# Patient Record
Sex: MALE | Race: WHITE | NOT HISPANIC OR LATINO | Employment: FULL TIME | URBAN - NONMETROPOLITAN AREA
[De-identification: names, ages, dates, MRNs, and addresses within clinical notes are randomized per-mention and may not be internally consistent; named-entity substitution may affect disease eponyms.]

---

## 2017-04-11 ENCOUNTER — OFFICE VISIT (OUTPATIENT)
Dept: GASTROENTEROLOGY | Facility: CLINIC | Age: 57
End: 2017-04-11

## 2017-04-11 VITALS
WEIGHT: 215.4 LBS | DIASTOLIC BLOOD PRESSURE: 90 MMHG | TEMPERATURE: 97.8 F | SYSTOLIC BLOOD PRESSURE: 138 MMHG | BODY MASS INDEX: 34.62 KG/M2 | HEIGHT: 66 IN | OXYGEN SATURATION: 90 % | HEART RATE: 94 BPM

## 2017-04-11 DIAGNOSIS — I10 ESSENTIAL HYPERTENSION: ICD-10-CM

## 2017-04-11 DIAGNOSIS — Z80.0 FAMILY HISTORY OF COLON CANCER: Primary | ICD-10-CM

## 2017-04-11 PROCEDURE — S0260 H&P FOR SURGERY: HCPCS | Performed by: NURSE PRACTITIONER

## 2017-04-11 RX ORDER — MELATONIN
1000 DAILY
COMMUNITY

## 2017-04-11 NOTE — PROGRESS NOTES
Chief Complaint   Patient presents with   • Colonoscopy     Patient currently not having any problems.  Possibly has family history of colon cancer. - Father     Subjective   HPI    Thierno Sebastian is a 56 y.o. male who presents to office for preventative maintenance.  There is not  a personal history of colon polyps.  There is not a history of colon cancer.  He does not have complaints of nausea/vomiting, change in bowels, weight loss, no BRBPR, no melena.  There is a family history of colon cancer-father dx age of 66.  There is not a family history of colon polyps.  Pt last colonoscopy-2011 .  Bowels do move on regular basis.    Past Medical History:   Diagnosis Date   • Hyperlipidemia    • Hypertension    • Obstructive sleep apnea on CPAP      Past Surgical History:   Procedure Laterality Date   • COLONOSCOPY  06/24/2011    normal   • TOE AMPUTATION      x2     Outpatient Prescriptions Marked as Taking for the 4/11/17 encounter (Office Visit) with KAITY Gonzalez   Medication Sig Dispense Refill   • amLODIPine (NORVASC) 5 MG tablet Take 5 mg by mouth Daily.     • aspirin 81 MG EC tablet Take 81 mg by mouth Daily.     • cholecalciferol (VITAMIN D3) 1000 UNITS tablet Take 1,000 Units by mouth Daily.     • losartan-hydrochlorothiazide (HYZAAR) 100-25 MG per tablet Take 1 tablet by mouth Daily.     • Omega-3 Fatty Acids (FISH OIL) 1000 MG capsule capsule Take 1,000 mg by mouth Daily With Breakfast.     • Psyllium (METAMUCIL FIBER PO) Take  by mouth.       No Known Allergies  Social History     Social History   • Marital status:      Spouse name: N/A   • Number of children: N/A   • Years of education: N/A     Occupational History   • Not on file.     Social History Main Topics   • Smoking status: Current Every Day Smoker     Types: Electronic Cigarette   • Smokeless tobacco: Not on file   • Alcohol use 0.6 oz/week     1 Cans of beer per week   • Drug use: No   • Sexual activity: Not on file     Other  Topics Concern   • Not on file     Social History Narrative     Family History   Problem Relation Age of Onset   • Colon cancer Father    • Colon polyps Neg Hx      Review of Systems   Constitutional: Negative for fatigue, fever and unexpected weight change.   HENT: Negative for hearing loss, sore throat and voice change.    Eyes: Negative for visual disturbance.   Respiratory: Negative for cough, shortness of breath and wheezing.    Cardiovascular: Negative for chest pain and palpitations.   Gastrointestinal: Negative for abdominal pain, blood in stool and vomiting.   Endocrine: Negative for polydipsia and polyuria.   Genitourinary: Negative for difficulty urinating, dysuria, hematuria and urgency.   Musculoskeletal: Negative for joint swelling and myalgias.   Skin: Negative for color change, rash and wound.   Neurological: Negative for dizziness, tremors, seizures and syncope.   Hematological: Does not bruise/bleed easily.   Psychiatric/Behavioral: Negative for agitation and confusion. The patient is not nervous/anxious.      Objective   Vitals:    04/11/17 1336   BP: 138/90   Pulse: 94   Temp: 97.8 °F (36.6 °C)   SpO2: 90%     Physical Exam   Constitutional: He is oriented to person, place, and time. He appears well-developed and well-nourished.   HENT:   Head: Normocephalic and atraumatic.   Eyes:   Pink, Nonicteric   Neck:   Global Assessment- supple. No JVD or lymphadenopathy   Cardiovascular: Normal rate, regular rhythm and normal heart sounds.  Exam reveals no gallop and no friction rub.    No murmur heard.  Pulmonary/Chest: Effort normal and breath sounds normal. No respiratory distress. He has no wheezes. He has no rales.   Inspection: Movements-Symmetrical   Abdominal: Soft. Bowel sounds are normal. He exhibits no distension and no mass. There is no tenderness. There is no rebound and no guarding.   Neurological: He is alert and oriented to person, place, and time.   General Exam-Deemed a reliable  historian, able to converse without difficulty and Able to move all extremities without difficulty     Imaging Results (most recent)     None        Assessment/Plan   Thierno was seen today for colonoscopy.    Diagnoses and all orders for this visit:    Family history of colon cancer  -     Case request  -     Sod Picosulfate-Mag Ox-Cit Acd (PREPOPIK) 10-3.5-12 MG-GM-GM pack; Take as directed    Essential hypertension      COLONOSCOPY WITH ANESTHESIA (N/A)      All risks, benefits, alternatives, and indications of colonoscopy procedure have been discussed with the patient. Risks to include perforation of the colon requiring possible surgery or colostomy, risk of bleeding from biopsies or removal of colon tissue, possibility of missing a colon polyp or cancer, or adverse drug reaction.  Benefits to include the diagnosis and management of disease of the colon and rectum. Alternatives to include barium enema, radiographic evaluation, lab testing or no intervention. Pt verbalizes understanding and agrees.     Patient is to continue all blood pressure and cardiac medications prior to procedure.     Advised pt to stop ASA day prior to procedure and to stop use of NSAIDs, Fish Oil, and MV 5 days prior to procedure.  Tylenol based products are ok to take.  Pt verbalized understanding.      There are no Patient Instructions on file for this visit.

## 2017-05-01 ENCOUNTER — ANESTHESIA EVENT (OUTPATIENT)
Dept: GASTROENTEROLOGY | Facility: HOSPITAL | Age: 57
End: 2017-05-01

## 2017-05-02 ENCOUNTER — ANESTHESIA (OUTPATIENT)
Dept: GASTROENTEROLOGY | Facility: HOSPITAL | Age: 57
End: 2017-05-02

## 2017-05-02 ENCOUNTER — HOSPITAL ENCOUNTER (OUTPATIENT)
Facility: HOSPITAL | Age: 57
Setting detail: HOSPITAL OUTPATIENT SURGERY
Discharge: HOME | End: 2017-05-02
Attending: INTERNAL MEDICINE | Admitting: INTERNAL MEDICINE

## 2017-05-02 ENCOUNTER — TELEPHONE (OUTPATIENT)
Dept: GASTROENTEROLOGY | Facility: CLINIC | Age: 57
End: 2017-05-02

## 2017-05-02 VITALS
HEIGHT: 66 IN | HEART RATE: 64 BPM | OXYGEN SATURATION: 98 % | WEIGHT: 209 LBS | RESPIRATION RATE: 16 BRPM | TEMPERATURE: 97.4 F | DIASTOLIC BLOOD PRESSURE: 66 MMHG | SYSTOLIC BLOOD PRESSURE: 117 MMHG | BODY MASS INDEX: 33.59 KG/M2

## 2017-05-02 PROCEDURE — 45378 DIAGNOSTIC COLONOSCOPY: CPT | Performed by: INTERNAL MEDICINE

## 2017-05-02 PROCEDURE — 25010000002 PROPOFOL 10 MG/ML EMULSION: Performed by: NURSE ANESTHETIST, CERTIFIED REGISTERED

## 2017-05-02 RX ORDER — LIDOCAINE HYDROCHLORIDE 20 MG/ML
INJECTION, SOLUTION INFILTRATION; PERINEURAL AS NEEDED
Status: DISCONTINUED | OUTPATIENT
Start: 2017-05-02 | End: 2017-05-02 | Stop reason: SURG

## 2017-05-02 RX ORDER — SODIUM CHLORIDE 0.9 % (FLUSH) 0.9 %
1-10 SYRINGE (ML) INJECTION AS NEEDED
Status: DISCONTINUED | OUTPATIENT
Start: 2017-05-02 | End: 2017-05-02 | Stop reason: HOSPADM

## 2017-05-02 RX ORDER — PROPOFOL 10 MG/ML
VIAL (ML) INTRAVENOUS AS NEEDED
Status: DISCONTINUED | OUTPATIENT
Start: 2017-05-02 | End: 2017-05-02 | Stop reason: SURG

## 2017-05-02 RX ORDER — SODIUM CHLORIDE 9 MG/ML
100 INJECTION, SOLUTION INTRAVENOUS CONTINUOUS
Status: DISCONTINUED | OUTPATIENT
Start: 2017-05-02 | End: 2017-05-02 | Stop reason: HOSPADM

## 2017-05-02 RX ADMIN — PROPOFOL 50 MG: 10 INJECTION, EMULSION INTRAVENOUS at 10:18

## 2017-05-02 RX ADMIN — LIDOCAINE HYDROCHLORIDE 100 MG: 20 INJECTION, SOLUTION INFILTRATION; PERINEURAL at 10:15

## 2017-05-02 RX ADMIN — PROPOFOL 100 MG: 10 INJECTION, EMULSION INTRAVENOUS at 10:15

## 2017-05-02 RX ADMIN — SODIUM CHLORIDE 100 ML/HR: 9 INJECTION, SOLUTION INTRAVENOUS at 09:45

## 2017-05-02 RX ADMIN — PROPOFOL 50 MG: 10 INJECTION, EMULSION INTRAVENOUS at 10:24

## 2019-03-05 ENCOUNTER — OFFICE VISIT (OUTPATIENT)
Dept: CARDIOLOGY | Facility: CLINIC | Age: 59
End: 2019-03-05

## 2019-03-05 VITALS
BODY MASS INDEX: 35.03 KG/M2 | WEIGHT: 218 LBS | HEIGHT: 66 IN | SYSTOLIC BLOOD PRESSURE: 137 MMHG | DIASTOLIC BLOOD PRESSURE: 92 MMHG | HEART RATE: 78 BPM

## 2019-03-05 DIAGNOSIS — R07.89 OTHER CHEST PAIN: ICD-10-CM

## 2019-03-05 DIAGNOSIS — E87.6 HYPOKALEMIA: ICD-10-CM

## 2019-03-05 DIAGNOSIS — I10 ESSENTIAL HYPERTENSION: Primary | ICD-10-CM

## 2019-03-05 PROCEDURE — 99204 OFFICE O/P NEW MOD 45 MIN: CPT | Performed by: INTERNAL MEDICINE

## 2019-03-05 PROCEDURE — 93000 ELECTROCARDIOGRAM COMPLETE: CPT | Performed by: INTERNAL MEDICINE

## 2019-03-05 RX ORDER — POTASSIUM CHLORIDE 1.5 G/1.77G
20 POWDER, FOR SOLUTION ORAL DAILY
COMMUNITY

## 2019-03-05 RX ORDER — SPIRONOLACTONE 25 MG/1
25 TABLET ORAL DAILY
Qty: 30 TABLET | Refills: 11 | Status: SHIPPED | OUTPATIENT
Start: 2019-03-05 | End: 2020-02-24

## 2019-03-05 RX ORDER — LOSARTAN POTASSIUM 100 MG/1
100 TABLET ORAL DAILY
COMMUNITY

## 2019-03-05 NOTE — PROGRESS NOTES
"Subjective    Thierno Sebastian is a 58 y.o. male. Referred by pcp for cp eval    History of Present Illness     CHEST PAIN:  Began 12/18 when he started on a thiazide diuretic and developed refractory hypokalemia. The pain is a twinge in the upper left chest without any ppt'ing for relieving factors. The cp is not exertional and always occurs at rest. He thinks his Chol is \"ok'. His father was a smoker and started having heart probs at 61 yo. No other cad in the family. His EKG is normal today    HTN:  Has required increasing meds lately and home reading are not to goal. Is compliant with diet and exercise    HYPOKALEMIA:  Is followed closely by his pcp and is now corrected \"3.5\". He is trying to eat a K+ rich diet now and has received limited instruction on that by me today.        The following portions of the patient's history were reviewed and updated as appropriate: allergies, current medications, past family history, past medical history, past social history, past surgical history and problem list.    Patient Active Problem List   Diagnosis   • Other chest pain   • Hypertension   • Hypokalemia       Allergies   Allergen Reactions   • Lisinopril Cough       Family History   Problem Relation Age of Onset   • Colon cancer Father    • Heart attack Father    • Heart attack Mother    • Colon polyps Neg Hx        Social History     Socioeconomic History   • Marital status:      Spouse name: Not on file   • Number of children: Not on file   • Years of education: Not on file   • Highest education level: Not on file   Social Needs   • Financial resource strain: Not on file   • Food insecurity - worry: Not on file   • Food insecurity - inability: Not on file   • Transportation needs - medical: Not on file   • Transportation needs - non-medical: Not on file   Occupational History   • Not on file   Tobacco Use   • Smoking status: Never Smoker   • Smokeless tobacco: Former User   Substance and Sexual Activity   • " "Alcohol use: Yes     Comment: occ   • Drug use: No   • Sexual activity: Defer   Other Topics Concern   • Not on file   Social History Narrative   • Not on file         Current Outpatient Medications:   •  amLODIPine (NORVASC) 5 MG tablet, Take 10 mg by mouth Daily., Disp: , Rfl:   •  aspirin 81 MG EC tablet, Take 81 mg by mouth Daily., Disp: , Rfl:   •  cholecalciferol (VITAMIN D3) 1000 UNITS tablet, Take 1,000 Units by mouth Daily., Disp: , Rfl:   •  losartan (COZAAR) 100 MG tablet, Take 100 mg by mouth Daily., Disp: , Rfl:   •  Omega-3 Fatty Acids (FISH OIL) 1000 MG capsule capsule, Take 1,000 mg by mouth Daily With Breakfast., Disp: , Rfl:   •  potassium chloride (KLOR-CON) 20 MEQ packet, Take 20 mEq by mouth 2 (Two) Times a Day., Disp: , Rfl:   •  spironolactone (ALDACTONE) 25 MG tablet, Take 1 tablet by mouth Daily., Disp: 30 tablet, Rfl: 11    Past Surgical History:   Procedure Laterality Date   • COLONOSCOPY  06/24/2011    normal   • COLONOSCOPY N/A 5/2/2017    Procedure: COLONOSCOPY WITH ANESTHESIA;  Surgeon: Sabino Carter DO;  Location: [facility] ENDOSCOPY;  Service:    • TOE AMPUTATION      x2       Review of Systems   Constitutional: Negative for fever and unexpected weight change.   HENT: Negative for congestion.    Eyes: Negative for visual disturbance.   Respiratory: Negative for apnea, shortness of breath and wheezing.    Cardiovascular: Positive for chest pain. Negative for palpitations and leg swelling.   Gastrointestinal: Negative for abdominal pain, blood in stool and vomiting.   Endocrine: Negative for cold intolerance and heat intolerance.   Genitourinary: Negative for difficulty urinating and hematuria.   Musculoskeletal: Positive for back pain. Negative for myalgias.   Skin: Negative for rash.   Neurological: Negative for syncope.   Hematological: Does not bruise/bleed easily.   Psychiatric/Behavioral: Negative for sleep disturbance.       /92   Pulse 78   Ht 167.6 cm (66\")   Wt 98.9 " kg (218 lb)   BMI 35.19 kg/m²   Procedures    Objective   Physical Exam   Constitutional: He is oriented to person, place, and time. He appears well-developed and well-nourished. No distress.   HENT:   Head: Normocephalic.   Eyes: Pupils are equal, round, and reactive to light.   Neck: No thyromegaly present.   Cardiovascular: Normal rate, regular rhythm, normal heart sounds and intact distal pulses. Exam reveals no gallop and no friction rub.   No murmur heard.  Pulmonary/Chest: Effort normal and breath sounds normal. No stridor. No respiratory distress. He has no wheezes. He has no rales. He exhibits no tenderness.   Abdominal: Soft. Bowel sounds are normal. He exhibits no distension and no mass. There is no tenderness. There is no rebound and no guarding.   Musculoskeletal: He exhibits no edema, tenderness or deformity.   Neurological: He is alert and oriented to person, place, and time.   Skin: Skin is warm and dry. He is not diaphoretic.   Psychiatric: He has a normal mood and affect.       Assessment/Plan   Thierno was seen today for chest pain.    Diagnoses and all orders for this visit:    Essential hypertension  Comments:  DIFFICULT TO CONTROL - long discussion on how to monitor at home - add Aldactone  Orders:  -     ECG 12 Lead  -     spironolactone (ALDACTONE) 25 MG tablet; Take 1 tablet by mouth Daily.    Other chest pain  Comments:  ATYPICAL - check tst in 2 weeks  Orders:  -     Treadmill Stress Test; Future    Hypokalemia  Comments:  POSS COMPONENT OF ALDOSTERONISM  - needs close lab fu with recheck locally in 1 week                 Return in about 3 months (around 6/5/2019) for with apc.  Orders Placed This Encounter   Procedures   • Treadmill Stress Test     Standing Status:   Future     Standing Expiration Date:   3/4/2020     Order Specific Question:   Reason for exam?     Answer:   Chest Pain   • ECG 12 Lead     Order Specific Question:   Reason for Exam:     Answer:   cpBen

## 2019-03-19 ENCOUNTER — HOSPITAL ENCOUNTER (OUTPATIENT)
Dept: CARDIOLOGY | Facility: HOSPITAL | Age: 59
Discharge: HOME | End: 2019-03-19
Admitting: INTERNAL MEDICINE

## 2019-03-19 VITALS
BODY MASS INDEX: 35.04 KG/M2 | WEIGHT: 218.03 LBS | HEART RATE: 70 BPM | SYSTOLIC BLOOD PRESSURE: 120 MMHG | DIASTOLIC BLOOD PRESSURE: 93 MMHG | HEIGHT: 66 IN

## 2019-03-19 DIAGNOSIS — R07.89 OTHER CHEST PAIN: ICD-10-CM

## 2019-03-19 LAB
BH CV STRESS BP STAGE 1: NORMAL
BH CV STRESS BP STAGE 2: NORMAL
BH CV STRESS BP STAGE 4: NORMAL
BH CV STRESS DURATION MIN STAGE 1: 3
BH CV STRESS DURATION MIN STAGE 2: 3
BH CV STRESS DURATION MIN STAGE 3: 3
BH CV STRESS DURATION MIN STAGE 4: 1
BH CV STRESS DURATION SEC STAGE 1: 0
BH CV STRESS DURATION SEC STAGE 2: 0
BH CV STRESS DURATION SEC STAGE 3: 0
BH CV STRESS DURATION SEC STAGE 4: 26
BH CV STRESS GRADE STAGE 1: 10
BH CV STRESS GRADE STAGE 2: 12
BH CV STRESS GRADE STAGE 3: 14
BH CV STRESS GRADE STAGE 4: 16
BH CV STRESS HR STAGE 1: 93
BH CV STRESS HR STAGE 2: 112
BH CV STRESS HR STAGE 3: 134
BH CV STRESS HR STAGE 4: 160
BH CV STRESS METS STAGE 1: 5
BH CV STRESS METS STAGE 2: 7.5
BH CV STRESS METS STAGE 3: 10
BH CV STRESS METS STAGE 4: 13.5
BH CV STRESS PROTOCOL 1: NORMAL
BH CV STRESS RECOVERY BP: NORMAL MMHG
BH CV STRESS RECOVERY HR: 98 BPM
BH CV STRESS SPEED STAGE 1: 1.7
BH CV STRESS SPEED STAGE 2: 2.5
BH CV STRESS SPEED STAGE 3: 3.4
BH CV STRESS SPEED STAGE 4: 4.2
BH CV STRESS STAGE 1: 1
BH CV STRESS STAGE 2: 2
BH CV STRESS STAGE 3: 3
BH CV STRESS STAGE 4: 4
MAXIMAL PREDICTED HEART RATE: 164 BPM
PERCENT MAX PREDICTED HR: 97.56 %
STRESS BASELINE BP: NORMAL MMHG
STRESS BASELINE HR: 70 BPM
STRESS PERCENT HR: 115 %
STRESS POST EXERCISE DUR MIN: 10 MIN
STRESS POST EXERCISE DUR SEC: 26 SEC
STRESS POST PEAK BP: NORMAL MMHG
STRESS POST PEAK HR: 160 BPM
STRESS TARGET HR: 139 BPM

## 2019-03-19 PROCEDURE — 93018 CV STRESS TEST I&R ONLY: CPT | Performed by: INTERNAL MEDICINE

## 2019-03-19 PROCEDURE — 93017 CV STRESS TEST TRACING ONLY: CPT

## 2019-09-10 ENCOUNTER — OFFICE VISIT (OUTPATIENT)
Dept: CARDIOLOGY | Facility: CLINIC | Age: 59
End: 2019-09-10

## 2019-09-10 VITALS
SYSTOLIC BLOOD PRESSURE: 145 MMHG | DIASTOLIC BLOOD PRESSURE: 80 MMHG | HEIGHT: 66 IN | BODY MASS INDEX: 35.52 KG/M2 | HEART RATE: 79 BPM | WEIGHT: 221 LBS | OXYGEN SATURATION: 98 % | RESPIRATION RATE: 18 BRPM

## 2019-09-10 DIAGNOSIS — I10 ESSENTIAL HYPERTENSION: Primary | ICD-10-CM

## 2019-09-10 DIAGNOSIS — E87.6 HYPOKALEMIA: ICD-10-CM

## 2019-09-10 DIAGNOSIS — R07.89 OTHER CHEST PAIN: ICD-10-CM

## 2019-09-10 PROCEDURE — 99214 OFFICE O/P EST MOD 30 MIN: CPT | Performed by: NURSE PRACTITIONER

## 2019-09-10 RX ORDER — AMLODIPINE BESYLATE 10 MG/1
10 TABLET ORAL DAILY
Qty: 90 TABLET | Refills: 3 | Status: SHIPPED | OUTPATIENT
Start: 2019-09-10 | End: 2020-07-21

## 2019-09-10 NOTE — PROGRESS NOTES
Subjective:     Encounter Date:09/10/2019      Patient ID: Thierno Sebastian is a 58 y.o. male with a history of hypertension and atypical chest pain with a negative treadmill stress test.    Chief Complaint: follow up  Hypertension   This is a chronic problem. The current episode started more than 1 year ago. The problem is unchanged. The problem is uncontrolled. Pertinent negatives include no chest pain, malaise/fatigue, orthopnea, palpitations, peripheral edema, PND or shortness of breath.   Chest Pain    The current episode started more than 1 month ago. The problem has been resolved. Pertinent negatives include no cough, dizziness, irregular heartbeat, malaise/fatigue, near-syncope, orthopnea, palpitations, PND, shortness of breath, sputum production, syncope or weakness.     Patient presents today for a follow up for chest pain and hypertension. He was last seen in our office for atypical chest pain. He had a normal treadmill stress test. He was hypokalemic and his HCTZ was stopped and placed on Aldactone. He state he has been having routine labs to monitor his potassium. His last check was last week and was 3.9. He denies chest pain, shortness of breath and palpitations.     The following portions of the patient's history were reviewed and updated as appropriate: allergies, current medications, past family history, past medical history, past social history, past surgical history and problem list.    Allergies   Allergen Reactions   • Lisinopril Cough       Current Outpatient Medications:   •  amLODIPine (NORVASC) 10 MG tablet, Take 1 tablet by mouth Daily., Disp: 90 tablet, Rfl: 3  •  aspirin 81 MG EC tablet, Take 81 mg by mouth Daily., Disp: , Rfl:   •  cholecalciferol (VITAMIN D3) 1000 UNITS tablet, Take 1,000 Units by mouth Daily., Disp: , Rfl:   •  losartan (COZAAR) 100 MG tablet, Take 100 mg by mouth Daily., Disp: , Rfl:   •  Omega-3 Fatty Acids (FISH OIL) 1000 MG capsule capsule, Take 1,000 mg by mouth  "Daily With Breakfast., Disp: , Rfl:   •  potassium chloride (KLOR-CON) 20 MEQ packet, Take 20 mEq by mouth Daily., Disp: , Rfl:   •  spironolactone (ALDACTONE) 25 MG tablet, Take 1 tablet by mouth Daily., Disp: 30 tablet, Rfl: 11  Past Medical History:   Diagnosis Date   • Hypertension    • Obstructive sleep apnea on CPAP      Family History   Problem Relation Age of Onset   • Colon cancer Father    • Heart attack Father    • Heart attack Mother    • Colon polyps Neg Hx      Past Surgical History:   Procedure Laterality Date   • COLONOSCOPY  06/24/2011    normal   • COLONOSCOPY N/A 5/2/2017    Procedure: COLONOSCOPY WITH ANESTHESIA;  Surgeon: Sabino Carter DO;  Location: [facility] ENDOSCOPY;  Service:    • TOE AMPUTATION      x2     Social History     Socioeconomic History   • Marital status:      Spouse name: Not on file   • Number of children: Not on file   • Years of education: Not on file   • Highest education level: Not on file   Tobacco Use   • Smoking status: Never Smoker   • Smokeless tobacco: Former User   Substance and Sexual Activity   • Alcohol use: Yes     Comment: occ   • Drug use: No   • Sexual activity: Defer       Review of Systems   Constitution: Negative for weakness, malaise/fatigue, weight gain and weight loss.   Cardiovascular: Negative for chest pain, dyspnea on exertion, irregular heartbeat, leg swelling, near-syncope, orthopnea, palpitations, paroxysmal nocturnal dyspnea and syncope.   Respiratory: Negative for cough, shortness of breath, sleep disturbances due to breathing, sputum production and wheezing.    Skin: Negative for dry skin, flushing, itching and rash.   Gastrointestinal: Negative for hematemesis and hematochezia.   Neurological: Negative for dizziness, light-headedness and loss of balance.       Procedures  /80 (BP Location: Right arm, Patient Position: Sitting, Cuff Size: Adult)   Pulse 79   Resp 18   Ht 167.6 cm (66\")   Wt 100 kg (221 lb)   SpO2 98%   BMI " 35.67 kg/m²        Objective:     Physical Exam   Constitutional: He is oriented to person, place, and time. He appears well-developed and well-nourished. No distress.   HENT:   Head: Normocephalic.   Mouth/Throat: No oropharyngeal exudate.   Eyes: Conjunctivae and EOM are normal. Pupils are equal, round, and reactive to light. No scleral icterus.   Neck: Normal range of motion. Neck supple.   Cardiovascular: Normal rate, regular rhythm, normal heart sounds and intact distal pulses.   No murmur heard.  Pulmonary/Chest: Effort normal and breath sounds normal. No respiratory distress. He has no wheezes. He has no rales. He exhibits no tenderness.   Abdominal: Soft. Bowel sounds are normal. He exhibits no distension. There is no tenderness.   Musculoskeletal: Normal range of motion. He exhibits no edema.   Neurological: He is alert and oriented to person, place, and time. He has normal reflexes.   Skin: Skin is warm and dry. No rash noted. He is not diaphoretic. No erythema. No pallor.   Psychiatric: He has a normal mood and affect. His behavior is normal.   Vitals reviewed.          Assessment:          Diagnosis Plan   1. Essential hypertension     2. Hypokalemia     3. Other chest pain            Plan:       1. HTN- slightly elevated today. States it is usually around 140/80. Increase norvasc to 10mg daily.   2. Hypokalemia- resolved with addition of aldosterone.   3. Other chest pain- resolved. No further episodes.     Follow up as needed.

## 2020-02-22 DIAGNOSIS — I10 ESSENTIAL HYPERTENSION: ICD-10-CM

## 2020-02-24 RX ORDER — SPIRONOLACTONE 25 MG/1
25 TABLET ORAL DAILY
Qty: 30 TABLET | Refills: 11 | Status: SHIPPED | OUTPATIENT
Start: 2020-02-24 | End: 2021-02-19

## 2020-07-21 RX ORDER — AMLODIPINE BESYLATE 10 MG/1
10 TABLET ORAL DAILY
Qty: 90 TABLET | Refills: 3 | Status: SHIPPED | OUTPATIENT
Start: 2020-07-21

## 2021-02-16 DIAGNOSIS — I10 ESSENTIAL HYPERTENSION: ICD-10-CM

## 2021-02-19 RX ORDER — SPIRONOLACTONE 25 MG/1
25 TABLET ORAL DAILY
Qty: 30 TABLET | Refills: 0 | Status: SHIPPED | OUTPATIENT
Start: 2021-02-19

## 2021-02-19 NOTE — TELEPHONE ENCOUNTER
BUD this is a [place] patient. I will refill a month but he needs to get with his PCP about continuing this medication since we are seeing him PRN      I left pt a  msg to let him know that we will fill it for a 30 day supply but will need to get this from his pcp from now on since he is a as needed pt.  Jose Hurtado, CMA

## 2021-07-06 RX ORDER — AMLODIPINE BESYLATE 10 MG/1
10 TABLET ORAL DAILY
Qty: 90 TABLET | Refills: 3 | OUTPATIENT
Start: 2021-07-06

## 2022-05-19 LAB
ALBUMIN SERPL-MCNC: 4.5 G/DL (ref 3.5–5.2)
ALP BLD-CCNC: 88 U/L (ref 40–130)
ALT SERPL-CCNC: 19 U/L (ref 5–41)
ANION GAP SERPL CALCULATED.3IONS-SCNC: 9 MMOL/L (ref 7–19)
AST SERPL-CCNC: 14 U/L (ref 5–40)
BILIRUB SERPL-MCNC: 0.6 MG/DL (ref 0.2–1.2)
BILIRUBIN URINE: NEGATIVE
BLOOD, URINE: NEGATIVE
BUN BLDV-MCNC: 11 MG/DL (ref 6–20)
CALCIUM SERPL-MCNC: 9.1 MG/DL (ref 8.6–10)
CHLORIDE BLD-SCNC: 104 MMOL/L (ref 98–111)
CLARITY: ABNORMAL
CO2: 30 MMOL/L (ref 22–29)
COLOR: YELLOW
CORTISOL - AM: 10.7 UG/DL (ref 6.2–19.4)
CREAT SERPL-MCNC: 0.9 MG/DL (ref 0.5–1.2)
CREATININE URINE: 121.8 MG/DL (ref 4.2–622)
GFR AFRICAN AMERICAN: >59
GFR NON-AFRICAN AMERICAN: >60
GLUCOSE BLD-MCNC: 99 MG/DL (ref 74–109)
GLUCOSE URINE: NEGATIVE MG/DL
KETONES, URINE: NEGATIVE MG/DL
LEUKOCYTE ESTERASE, URINE: NEGATIVE
MAGNESIUM: 2.2 MG/DL (ref 1.6–2.6)
NITRITE, URINE: NEGATIVE
OSMOLALITY URINE: 533 MOSM/KG (ref 250–1200)
OSMOLALITY: 305 MOSM/KG (ref 275–300)
PH UA: 7.5 (ref 5–8)
POTASSIUM SERPL-SCNC: 3.5 MMOL/L (ref 3.5–5)
POTASSIUM, UR: 88.55 MMOL/L
PROTEIN PROTEIN: 17 MG/DL (ref 15–45)
PROTEIN UA: ABNORMAL MG/DL
SODIUM BLD-SCNC: 143 MMOL/L (ref 136–145)
SODIUM URINE: 85 MMOL/L
SPECIFIC GRAVITY UA: 1.01 (ref 1–1.03)
TOTAL PROTEIN: 7.4 G/DL (ref 6.6–8.7)
URIC ACID, SERUM: 4.9 MG/DL (ref 3.4–7)
UROBILINOGEN, URINE: 0.2 E.U./DL

## 2022-05-21 ENCOUNTER — TELEPHONE (OUTPATIENT)
Dept: GASTROENTEROLOGY | Facility: CLINIC | Age: 62
End: 2022-05-21

## 2022-05-21 LAB — ALDOSTERONE: 27.7 NG/DL

## 2022-05-21 NOTE — TELEPHONE ENCOUNTER
Bebe Ahn APRN    Patient records from physician reviewed  According to medical record Thierno Sebastian does not have a history of heart disease or lung disease.  According to medical record Thierno Sebastian is not currently on blood thinner.  According to medical record Thierno Sebastian is not currently exhibiting abdominal pain, weight loss, hematochezia, melena,  change in bowels, or other symptoms to indicate pt would need to be seen in office.    Will submit order for Thienro Sebastian to proceed with CScope    Medication will be verified as well as a lack of GI related symptomology when pt is scheduled for procedure.      COLONOSCOPY (05/02/2017 10:08)-normal     Positive family history of colon cancer

## 2022-05-22 LAB
+IMM: NORMAL
ALBUMIN SERPL-MCNC: 4.26 G/DL (ref 3.75–5.01)
ALPHA-1-GLOBULIN: 0.29 G/DL (ref 0.19–0.46)
ALPHA-2-GLOBULIN: 0.66 G/DL (ref 0.48–1.05)
BETA GLOBULIN: 0.94 G/DL (ref 0.48–1.1)
GAMMA GLOBULIN: 0.95 G/DL (ref 0.62–1.51)
IGA: 293 MG/DL (ref 68–408)
IGG: 896 MG/DL (ref 768–1632)
IGM: 63 MG/DL (ref 35–263)
KAPPA FREE LIGHT CHAINS QNT: 16.26 MG/L (ref 3.3–19.4)
KAPPA/LAMBDA FREE LIGHT CHAIN RATIO: 1.2 (ref 0.26–1.65)
LAMBDA FREE LIGHT CHAINS QNT: 13.52 MG/L (ref 5.71–26.3)
RENIN ACTIVITY: 1.7 NG/ML/HR
SPE/IFE INTERPRETATION: NORMAL
TOTAL PROTEIN: 7.1 G/DL (ref 6.3–8.2)

## 2022-05-23 ENCOUNTER — PREP FOR SURGERY (OUTPATIENT)
Dept: OTHER | Facility: HOSPITAL | Age: 62
End: 2022-05-23

## 2022-05-23 DIAGNOSIS — Z80.0 FAMILY HISTORY OF COLON CANCER: Primary | ICD-10-CM

## 2022-05-23 NOTE — TELEPHONE ENCOUNTER
Patient states no NEW GI, HEART, or LUNG problems at this time.     We verified insurance, meds, and past medical history.    Patient verbally understood instructions and stated they would have a  with them the day of procedure.    [facility] : [facility]    I will mail a copy of instructions and instructed patient to contact me if they have any questions.     Please put in case request.     Thanks.       07/08/2022 at 9:15am

## 2022-07-08 ENCOUNTER — ANESTHESIA (OUTPATIENT)
Dept: GASTROENTEROLOGY | Facility: HOSPITAL | Age: 62
End: 2022-07-08

## 2022-07-08 ENCOUNTER — ANESTHESIA EVENT (OUTPATIENT)
Dept: GASTROENTEROLOGY | Facility: HOSPITAL | Age: 62
End: 2022-07-08

## 2022-07-08 ENCOUNTER — HOSPITAL ENCOUNTER (OUTPATIENT)
Facility: HOSPITAL | Age: 62
Setting detail: HOSPITAL OUTPATIENT SURGERY
Discharge: HOME | End: 2022-07-08
Attending: INTERNAL MEDICINE | Admitting: INTERNAL MEDICINE

## 2022-07-08 VITALS
HEART RATE: 67 BPM | DIASTOLIC BLOOD PRESSURE: 76 MMHG | WEIGHT: 220 LBS | TEMPERATURE: 97.5 F | HEIGHT: 66 IN | BODY MASS INDEX: 35.36 KG/M2 | RESPIRATION RATE: 16 BRPM | OXYGEN SATURATION: 95 % | SYSTOLIC BLOOD PRESSURE: 112 MMHG

## 2022-07-08 PROCEDURE — 25010000002 PROPOFOL 10 MG/ML EMULSION: Performed by: NURSE ANESTHETIST, CERTIFIED REGISTERED

## 2022-07-08 PROCEDURE — 45378 DIAGNOSTIC COLONOSCOPY: CPT | Performed by: INTERNAL MEDICINE

## 2022-07-08 RX ORDER — SODIUM CHLORIDE 9 MG/ML
100 INJECTION, SOLUTION INTRAVENOUS CONTINUOUS
Status: CANCELLED | OUTPATIENT
Start: 2022-07-08

## 2022-07-08 RX ORDER — SODIUM CHLORIDE 0.9 % (FLUSH) 0.9 %
10 SYRINGE (ML) INJECTION EVERY 12 HOURS SCHEDULED
Status: CANCELLED | OUTPATIENT
Start: 2022-07-08

## 2022-07-08 RX ORDER — SODIUM CHLORIDE 0.9 % (FLUSH) 0.9 %
10 SYRINGE (ML) INJECTION AS NEEDED
Status: DISCONTINUED | OUTPATIENT
Start: 2022-07-08 | End: 2022-07-08 | Stop reason: HOSPADM

## 2022-07-08 RX ORDER — LIDOCAINE HYDROCHLORIDE 20 MG/ML
INJECTION, SOLUTION EPIDURAL; INFILTRATION; INTRACAUDAL; PERINEURAL AS NEEDED
Status: DISCONTINUED | OUTPATIENT
Start: 2022-07-08 | End: 2022-07-08 | Stop reason: SURG

## 2022-07-08 RX ORDER — SODIUM CHLORIDE 9 MG/ML
500 INJECTION, SOLUTION INTRAVENOUS CONTINUOUS PRN
Status: DISCONTINUED | OUTPATIENT
Start: 2022-07-08 | End: 2022-07-08 | Stop reason: HOSPADM

## 2022-07-08 RX ORDER — PROPOFOL 10 MG/ML
VIAL (ML) INTRAVENOUS AS NEEDED
Status: DISCONTINUED | OUTPATIENT
Start: 2022-07-08 | End: 2022-07-08 | Stop reason: SURG

## 2022-07-08 RX ORDER — SODIUM CHLORIDE 0.9 % (FLUSH) 0.9 %
10 SYRINGE (ML) INJECTION AS NEEDED
Status: CANCELLED | OUTPATIENT
Start: 2022-07-08

## 2022-07-08 RX ORDER — LIDOCAINE HYDROCHLORIDE 10 MG/ML
0.5 INJECTION, SOLUTION EPIDURAL; INFILTRATION; INTRACAUDAL; PERINEURAL ONCE AS NEEDED
Status: CANCELLED | OUTPATIENT
Start: 2022-07-08

## 2022-07-08 RX ADMIN — PROPOFOL 70 MG: 10 INJECTION, EMULSION INTRAVENOUS at 10:27

## 2022-07-08 RX ADMIN — SODIUM CHLORIDE 500 ML: 9 INJECTION, SOLUTION INTRAVENOUS at 10:11

## 2022-07-08 RX ADMIN — LIDOCAINE HYDROCHLORIDE 60 MG: 20 INJECTION, SOLUTION EPIDURAL; INFILTRATION; INTRACAUDAL; PERINEURAL at 10:27

## 2022-07-08 RX ADMIN — PROPOFOL 30 MG: 10 INJECTION, EMULSION INTRAVENOUS at 10:35

## 2022-07-08 RX ADMIN — PROPOFOL 30 MG: 10 INJECTION, EMULSION INTRAVENOUS at 10:34

## 2022-07-08 RX ADMIN — PROPOFOL 30 MG: 10 INJECTION, EMULSION INTRAVENOUS at 10:30

## 2022-07-08 RX ADMIN — PROPOFOL 40 MG: 10 INJECTION, EMULSION INTRAVENOUS at 10:28

## 2022-07-08 RX ADMIN — PROPOFOL 30 MG: 10 INJECTION, EMULSION INTRAVENOUS at 10:32

## 2022-07-08 RX ADMIN — PROPOFOL 30 MG: 10 INJECTION, EMULSION INTRAVENOUS at 10:29

## 2022-07-08 NOTE — ANESTHESIA PREPROCEDURE EVALUATION
Anesthesia Evaluation     Patient summary reviewed   no history of anesthetic complications:  NPO Solid Status: > 8 hours             Airway   Mallampati: I  TM distance: >3 FB  Neck ROM: full  no difficulty expected  Dental - normal exam     Pulmonary    (+) a smoker (e cigs), sleep apnea on CPAP,   Cardiovascular   Exercise tolerance: good (4-7 METS)    (+) hypertension, hyperlipidemia,   (-) pacemaker, valvular problems/murmurs, past MI, cardiac stents, CABG      Neuro/Psych  (-) seizures, TIA, CVA  GI/Hepatic/Renal/Endo    (+) obesity,     (-) liver disease, diabetes    Musculoskeletal (-) negative ROS    Abdominal    Substance History - negative use     OB/GYN          Other                          Anesthesia Plan    ASA 2     MAC     intravenous induction     Anesthetic plan, risks, benefits, and alternatives have been provided, discussed and informed consent has been obtained with: patient and spouse/significant other.

## 2022-07-08 NOTE — ANESTHESIA POSTPROCEDURE EVALUATION
Patient: Thierno HARRIS    Procedure Summary     Date: 07/08/22 Room / Location: [facility] ENDOSCOPY 5 / [facility]    Anesthesia Start: 1020 Anesthesia Stop: 1039    Procedure: COLONOSCOPY WITH ANESTHESIA (N/A ) Diagnosis:       Family history of colon cancer      (Family history of colon cancer [Z80.0])    Surgeons: Sabino Carter DO Provider: Jordan Shetty CRNA    Anesthesia Type: MAC ASA Status: 2          Anesthesia Type: MAC    Vitals  Vitals Value Taken Time   /76 07/08/22 1051   Temp     Pulse 69 07/08/22 1053   Resp 16 07/08/22 1050   SpO2 97 % 07/08/22 1053   Vitals shown include unvalidated device data.        Post Anesthesia Care and Evaluation    Patient location during evaluation: PHASE II  Patient participation: complete - patient participated  Level of consciousness: awake  Pain score: 0  Pain management: adequate    Airway patency: patent  Anesthetic complications: No anesthetic complications  PONV Status: none  Cardiovascular status: acceptable  Respiratory status: acceptable  Hydration status: acceptable

## 2022-07-08 NOTE — H&P
[facility]  Pre Procedure History & Physical    Chief Complaint:   Fhx colon ca    Subjective     HPI:   FDhx colon ca    Past Medical History:   Past Medical History:   Diagnosis Date   • Hypertension    • Obstructive sleep apnea on CPAP        Past Surgical History:  Past Surgical History:   Procedure Laterality Date   • COLONOSCOPY  06/24/2011    normal   • COLONOSCOPY N/A 5/2/2017    Procedure: COLONOSCOPY WITH ANESTHESIA;  Surgeon: Sabino Carter DO;  Location: [facility] ENDOSCOPY;  Service:    • TOE AMPUTATION      x2       Family History:  Family History   Problem Relation Age of Onset   • Colon cancer Father    • Heart attack Father    • Heart attack Mother    • Colon polyps Neg Hx        Social History:   reports that he has never smoked. He quit smokeless tobacco use about 10 years ago. He reports current alcohol use. He reports that he does not use drugs.    Medications:   Prior to Admission medications    Medication Sig Start Date End Date Taking? Authorizing Provider   amLODIPine (NORVASC) 10 MG tablet TAKE 1 TABLET BY MOUTH DAILY 7/21/20  Yes Pam Kwon APRN   losartan (COZAAR) 100 MG tablet Take 100 mg by mouth Daily.   Yes Bernice Degroot MD   spironolactone (ALDACTONE) 25 MG tablet TAKE 1 TABLET BY MOUTH DAILY 2/19/21  Yes Pam Kwon APRN   aspirin 81 MG EC tablet Take 81 mg by mouth Daily.    Bernice Degroot MD   cholecalciferol (VITAMIN D3) 1000 UNITS tablet Take 1,000 Units by mouth Daily.    Bernice Degroot MD   Omega-3 Fatty Acids (FISH OIL) 1000 MG capsule capsule Take 1,000 mg by mouth Daily With Breakfast.    ProviderBernice MD   potassium chloride (KLOR-CON) 20 MEQ packet Take 20 mEq by mouth Daily.    Bernice Degroot MD       Allergies:  Lisinopril    ROS:    General: Weight stable  Resp: No SOA  Cardiovascular: No CP    Objective     Blood pressure 147/92, pulse 78, temperature 97.5 °F (36.4 °C), temperature source Temporal,  "resp. rate 18, height 167.6 cm (66\"), weight 99.8 kg (220 lb), SpO2 97 %.    Physical Exam   Constitutional: Pt is oriented to person, place, and in no distress.   HENT: Mouth/Throat: Oropharynx is clear.   Cardiovascular: Normal rate, regular rhythm.    Pulmonary/Chest: Effort normal. No respiratory distress. No  wheezes.   Abdominal: Soft. Non-distended.  Skin: Skin is warm and dry.   Psychiatric: Mood, memory, affect and judgment appear normal.     Assessment & Plan     Diagnosis:  Fhx colon ca    Anticipated Surgical Procedure:  C-scope    The risks, benefits, and alternatives of this procedure have been discussed with the patient or the responsible party- the patient understands and agrees to proceed.        "

## 2022-07-14 ENCOUNTER — TELEPHONE (OUTPATIENT)
Dept: GASTROENTEROLOGY | Facility: CLINIC | Age: 62
End: 2022-07-14

## 2022-08-26 LAB
ALBUMIN SERPL-MCNC: 4.5 G/DL (ref 3.5–5.2)
ALP BLD-CCNC: 71 U/L (ref 40–130)
ALT SERPL-CCNC: 13 U/L (ref 5–41)
ANION GAP SERPL CALCULATED.3IONS-SCNC: 12 MMOL/L (ref 7–19)
AST SERPL-CCNC: 11 U/L (ref 5–40)
BASOPHILS ABSOLUTE: 0.1 K/UL (ref 0–0.2)
BASOPHILS RELATIVE PERCENT: 0.9 % (ref 0–1)
BILIRUB SERPL-MCNC: 0.5 MG/DL (ref 0.2–1.2)
BILIRUBIN URINE: NEGATIVE
BLOOD, URINE: NEGATIVE
BUN BLDV-MCNC: 15 MG/DL (ref 6–20)
CALCIUM SERPL-MCNC: 8.9 MG/DL (ref 8.6–10)
CHLORIDE BLD-SCNC: 105 MMOL/L (ref 98–111)
CLARITY: CLEAR
CO2: 23 MMOL/L (ref 22–29)
COLOR: YELLOW
CREAT SERPL-MCNC: 1 MG/DL (ref 0.5–1.2)
EOSINOPHILS ABSOLUTE: 0.1 K/UL (ref 0–0.6)
EOSINOPHILS RELATIVE PERCENT: 1.7 % (ref 0–5)
GFR AFRICAN AMERICAN: >59
GFR NON-AFRICAN AMERICAN: >60
GLUCOSE BLD-MCNC: 97 MG/DL (ref 74–109)
GLUCOSE URINE: NEGATIVE MG/DL
HCT VFR BLD CALC: 39.7 % (ref 42–52)
HEMOGLOBIN: 13.8 G/DL (ref 14–18)
IMMATURE GRANULOCYTES #: 0 K/UL
KETONES, URINE: NEGATIVE MG/DL
LEUKOCYTE ESTERASE, URINE: NEGATIVE
LYMPHOCYTES ABSOLUTE: 2.3 K/UL (ref 1.1–4.5)
LYMPHOCYTES RELATIVE PERCENT: 40.2 % (ref 20–40)
MAGNESIUM: 2 MG/DL (ref 1.6–2.6)
MCH RBC QN AUTO: 31.1 PG (ref 27–31)
MCHC RBC AUTO-ENTMCNC: 34.8 G/DL (ref 33–37)
MCV RBC AUTO: 89.4 FL (ref 80–94)
MONOCYTES ABSOLUTE: 0.4 K/UL (ref 0–0.9)
MONOCYTES RELATIVE PERCENT: 7.6 % (ref 0–10)
NEUTROPHILS ABSOLUTE: 2.8 K/UL (ref 1.5–7.5)
NEUTROPHILS RELATIVE PERCENT: 49.3 % (ref 50–65)
NITRITE, URINE: NEGATIVE
PDW BLD-RTO: 13 % (ref 11.5–14.5)
PH UA: 6 (ref 5–8)
PLATELET # BLD: 364 K/UL (ref 130–400)
PMV BLD AUTO: 9.3 FL (ref 9.4–12.4)
POTASSIUM SERPL-SCNC: 4.1 MMOL/L (ref 3.5–5)
PROTEIN UA: NEGATIVE MG/DL
RBC # BLD: 4.44 M/UL (ref 4.7–6.1)
SODIUM BLD-SCNC: 140 MMOL/L (ref 136–145)
SPECIFIC GRAVITY UA: 1.02 (ref 1–1.03)
TOTAL PROTEIN: 6.9 G/DL (ref 6.6–8.7)
URIC ACID, SERUM: 6.1 MG/DL (ref 3.4–7)
UROBILINOGEN, URINE: 0.2 E.U./DL
WBC # BLD: 5.8 K/UL (ref 4.8–10.8)

## 2023-08-10 ENCOUNTER — TRANSCRIBE ORDERS (OUTPATIENT)
Dept: ADMINISTRATIVE | Age: 63
End: 2023-08-10

## 2023-08-10 DIAGNOSIS — N64.4 BREAST TENDERNESS IN MALE: Primary | ICD-10-CM

## 2023-08-30 LAB
ALBUMIN SERPL-MCNC: 4.5 G/DL (ref 3.5–5.2)
ALP SERPL-CCNC: 60 U/L (ref 40–130)
ALT SERPL-CCNC: 36 U/L (ref 5–41)
ANION GAP SERPL CALCULATED.3IONS-SCNC: 12 MMOL/L (ref 7–19)
AST SERPL-CCNC: 18 U/L (ref 5–40)
BASOPHILS # BLD: 0.1 K/UL (ref 0–0.2)
BASOPHILS NFR BLD: 0.9 % (ref 0–1)
BILIRUB SERPL-MCNC: 0.6 MG/DL (ref 0.2–1.2)
BILIRUB UR QL STRIP: NEGATIVE
BUN SERPL-MCNC: 13 MG/DL (ref 8–23)
CALCIUM SERPL-MCNC: 9.1 MG/DL (ref 8.8–10.2)
CHLORIDE SERPL-SCNC: 102 MMOL/L (ref 98–111)
CLARITY UR: CLEAR
CO2 SERPL-SCNC: 26 MMOL/L (ref 22–29)
COLOR UR: YELLOW
CREAT SERPL-MCNC: 1.1 MG/DL (ref 0.5–1.2)
EOSINOPHIL # BLD: 0.2 K/UL (ref 0–0.6)
EOSINOPHIL NFR BLD: 3.1 % (ref 0–5)
ERYTHROCYTE [DISTWIDTH] IN BLOOD BY AUTOMATED COUNT: 12.4 % (ref 11.5–14.5)
GLUCOSE SERPL-MCNC: 99 MG/DL (ref 74–109)
GLUCOSE UR STRIP.AUTO-MCNC: NEGATIVE MG/DL
HCT VFR BLD AUTO: 45.1 % (ref 42–52)
HGB BLD-MCNC: 15.4 G/DL (ref 14–18)
HGB UR STRIP.AUTO-MCNC: NEGATIVE MG/L
IMM GRANULOCYTES # BLD: 0.1 K/UL
KETONES UR STRIP.AUTO-MCNC: NEGATIVE MG/DL
LEUKOCYTE ESTERASE UR QL STRIP.AUTO: NEGATIVE
LYMPHOCYTES # BLD: 3.4 K/UL (ref 1.1–4.5)
LYMPHOCYTES NFR BLD: 44.4 % (ref 20–40)
MAGNESIUM SERPL-MCNC: 2.3 MG/DL (ref 1.6–2.4)
MCH RBC QN AUTO: 31.4 PG (ref 27–31)
MCHC RBC AUTO-ENTMCNC: 34.1 G/DL (ref 33–37)
MCV RBC AUTO: 92 FL (ref 80–94)
MONOCYTES # BLD: 0.6 K/UL (ref 0–0.9)
MONOCYTES NFR BLD: 7.3 % (ref 0–10)
NEUTROPHILS # BLD: 3.4 K/UL (ref 1.5–7.5)
NEUTS SEG NFR BLD: 43.7 % (ref 50–65)
NITRITE UR QL STRIP.AUTO: NEGATIVE
PH UR STRIP.AUTO: 6 [PH] (ref 5–8)
PLATELET # BLD AUTO: 388 K/UL (ref 130–400)
PMV BLD AUTO: 9.1 FL (ref 9.4–12.4)
POTASSIUM SERPL-SCNC: 4.2 MMOL/L (ref 3.5–5)
PROT SERPL-MCNC: 7.4 G/DL (ref 6.6–8.7)
PROT UR STRIP.AUTO-MCNC: NEGATIVE MG/DL
PTH-INTACT SERPL-MCNC: 49.2 PG/ML (ref 15–65)
RBC # BLD AUTO: 4.9 M/UL (ref 4.7–6.1)
SODIUM SERPL-SCNC: 140 MMOL/L (ref 136–145)
SP GR UR STRIP.AUTO: 1.02 (ref 1–1.03)
URATE SERPL-MCNC: 6.3 MG/DL (ref 3.4–7)
UROBILINOGEN UR STRIP.AUTO-MCNC: 0.2 E.U./DL
WBC # BLD AUTO: 7.7 K/UL (ref 4.8–10.8)

## 2023-09-06 ENCOUNTER — HOSPITAL ENCOUNTER (OUTPATIENT)
Dept: WOMENS IMAGING | Age: 63
End: 2023-09-06
Payer: OTHER GOVERNMENT

## 2023-09-06 ENCOUNTER — HOSPITAL ENCOUNTER (OUTPATIENT)
Dept: WOMENS IMAGING | Age: 63
Discharge: HOME | End: 2023-09-06
Payer: OTHER GOVERNMENT

## 2023-09-06 DIAGNOSIS — N64.4 BREAST TENDERNESS IN MALE: ICD-10-CM

## 2023-09-06 PROCEDURE — G0279 TOMOSYNTHESIS, MAMMO: HCPCS

## 2023-09-08 ENCOUNTER — TRANSCRIBE ORDERS (OUTPATIENT)
Dept: ADMINISTRATIVE | Age: 63
End: 2023-09-08

## 2023-09-08 DIAGNOSIS — N64.4 BREAST TENDERNESS: Primary | ICD-10-CM

## 2024-01-02 LAB
ALBUMIN SERPL-MCNC: 4.2 G/DL (ref 3.5–5.2)
ALP SERPL-CCNC: 82 U/L (ref 40–130)
ALT SERPL-CCNC: 26 U/L (ref 5–41)
ANION GAP SERPL CALCULATED.3IONS-SCNC: 11 MMOL/L (ref 7–19)
AST SERPL-CCNC: 19 U/L (ref 5–40)
BILIRUB SERPL-MCNC: 0.6 MG/DL (ref 0.2–1.2)
BUN SERPL-MCNC: 15 MG/DL (ref 8–23)
CALCIUM SERPL-MCNC: 9 MG/DL (ref 8.8–10.2)
CHLORIDE SERPL-SCNC: 102 MMOL/L (ref 98–111)
CO2 SERPL-SCNC: 32 MMOL/L (ref 22–29)
CREAT SERPL-MCNC: 0.9 MG/DL (ref 0.5–1.2)
GLUCOSE SERPL-MCNC: 108 MG/DL (ref 74–109)
MAGNESIUM SERPL-MCNC: 2.1 MG/DL (ref 1.6–2.4)
POTASSIUM SERPL-SCNC: 2.9 MMOL/L (ref 3.5–5)
PROT SERPL-MCNC: 7.1 G/DL (ref 6.6–8.7)
SODIUM SERPL-SCNC: 145 MMOL/L (ref 136–145)

## 2024-01-03 LAB — ALDOST SERPL-MCNC: 39.7 NG/DL

## 2024-07-03 LAB
ALBUMIN SERPL-MCNC: 4.2 G/DL (ref 3.5–5.2)
ALP SERPL-CCNC: 89 U/L (ref 40–130)
ALT SERPL-CCNC: 21 U/L (ref 5–41)
ANION GAP SERPL CALCULATED.3IONS-SCNC: 12 MMOL/L (ref 7–19)
AST SERPL-CCNC: 13 U/L (ref 5–40)
BILIRUB SERPL-MCNC: 0.6 MG/DL (ref 0.2–1.2)
BUN SERPL-MCNC: 15 MG/DL (ref 8–23)
CALCIUM SERPL-MCNC: 8.7 MG/DL (ref 8.8–10.2)
CHLORIDE SERPL-SCNC: 103 MMOL/L (ref 98–111)
CO2 SERPL-SCNC: 25 MMOL/L (ref 22–29)
CREAT SERPL-MCNC: 1 MG/DL (ref 0.5–1.2)
GLUCOSE SERPL-MCNC: 116 MG/DL (ref 74–109)
POTASSIUM SERPL-SCNC: 4 MMOL/L (ref 3.5–5)
PROT SERPL-MCNC: 7.1 G/DL (ref 6.6–8.7)
SODIUM SERPL-SCNC: 140 MMOL/L (ref 136–145)

## 2025-07-16 LAB
ALBUMIN SERPL-MCNC: 4.4 G/DL (ref 3.5–5.2)
ALP SERPL-CCNC: 82 U/L (ref 40–129)
ALT SERPL-CCNC: 35 U/L (ref 10–50)
ANION GAP SERPL CALCULATED.3IONS-SCNC: 10 MMOL/L (ref 8–16)
AST SERPL-CCNC: 26 U/L (ref 10–50)
BASOPHILS # BLD: 0.1 K/UL (ref 0–0.2)
BASOPHILS NFR BLD: 1.4 % (ref 0–1)
BILIRUB SERPL-MCNC: 0.6 MG/DL (ref 0.2–1.2)
BILIRUB UR QL STRIP: NEGATIVE
BUN SERPL-MCNC: 16 MG/DL (ref 8–23)
CALCIUM SERPL-MCNC: 9.1 MG/DL (ref 8.8–10.2)
CHLORIDE SERPL-SCNC: 106 MMOL/L (ref 98–107)
CLARITY UR: CLEAR
CO2 SERPL-SCNC: 26 MMOL/L (ref 22–29)
COLOR UR: YELLOW
CREAT SERPL-MCNC: 0.9 MG/DL (ref 0.7–1.2)
EOSINOPHIL # BLD: 0.5 K/UL (ref 0–0.6)
EOSINOPHIL NFR BLD: 6.8 % (ref 0–5)
ERYTHROCYTE [DISTWIDTH] IN BLOOD BY AUTOMATED COUNT: 12.5 % (ref 11.5–14.5)
GLUCOSE SERPL-MCNC: 116 MG/DL (ref 70–99)
GLUCOSE UR STRIP.AUTO-MCNC: NEGATIVE MG/DL
HCT VFR BLD AUTO: 44.4 % (ref 42–52)
HGB BLD-MCNC: 15.2 G/DL (ref 14–18)
HGB UR STRIP.AUTO-MCNC: NEGATIVE MG/L
IMM GRANULOCYTES # BLD: 0 K/UL
KETONES UR STRIP.AUTO-MCNC: NEGATIVE MG/DL
LEUKOCYTE ESTERASE UR QL STRIP.AUTO: NEGATIVE
LYMPHOCYTES # BLD: 2.6 K/UL (ref 1.1–4.5)
LYMPHOCYTES NFR BLD: 40.1 % (ref 20–40)
MAGNESIUM SERPL-MCNC: 2.2 MG/DL (ref 1.6–2.4)
MCH RBC QN AUTO: 31.1 PG (ref 27–31)
MCHC RBC AUTO-ENTMCNC: 34.2 G/DL (ref 33–37)
MCV RBC AUTO: 91 FL (ref 80–94)
MONOCYTES # BLD: 0.6 K/UL (ref 0–0.9)
MONOCYTES NFR BLD: 9 % (ref 0–10)
NEUTROPHILS # BLD: 2.8 K/UL (ref 1.5–7.5)
NEUTS SEG NFR BLD: 42.5 % (ref 50–65)
NITRITE UR QL STRIP.AUTO: NEGATIVE
PH UR STRIP.AUTO: 7 [PH] (ref 5–8)
PLATELET # BLD AUTO: 344 K/UL (ref 130–400)
PMV BLD AUTO: 9.7 FL (ref 9.4–12.4)
POTASSIUM SERPL-SCNC: 3.7 MMOL/L (ref 3.5–5.1)
PROT SERPL-MCNC: 6.9 G/DL (ref 6.4–8.3)
PROT UR STRIP.AUTO-MCNC: NEGATIVE MG/DL
RBC # BLD AUTO: 4.88 M/UL (ref 4.7–6.1)
SODIUM SERPL-SCNC: 142 MMOL/L (ref 136–145)
SP GR UR STRIP.AUTO: 1.02 (ref 1–1.03)
URATE SERPL-MCNC: 5.4 MG/DL (ref 3.4–7)
UROBILINOGEN UR STRIP.AUTO-MCNC: 0.2 E.U./DL
WBC # BLD AUTO: 6.6 K/UL (ref 4.8–10.8)

## (undated) DEVICE — THE CHANNEL CLEANING BRUSH IS A NYLON FLEXI BRUSH ATTACHED TO A FLEXIBLE PLASTIC SHEATH DESIGNED TO SAFELY REMOVE DEBRIS FROM FLEXIBLE ENDOSCOPES.

## (undated) DEVICE — SENSR O2 OXIMAX FNGR A/ 18IN NONSTR

## (undated) DEVICE — Device: Brand: DEFENDO AIR/WATER/SUCTION AND BIOPSY VALVE

## (undated) DEVICE — TBG SMPL FLTR LINE NASL 02/C02 A/ BX/100

## (undated) DEVICE — MASK,OXYGEN,MED CONC,ADLT,7' TUB, UC: Brand: PENDING

## (undated) DEVICE — ENDOGATOR AUXILIARY WATER JET CONNECTOR: Brand: ENDOGATOR

## (undated) DEVICE — MSK O2 MD CONCENTR A/ LF 7FT 1P/U

## (undated) DEVICE — YANKAUER,BULB TIP WITH VENT: Brand: ARGYLE